# Patient Record
Sex: FEMALE
[De-identification: names, ages, dates, MRNs, and addresses within clinical notes are randomized per-mention and may not be internally consistent; named-entity substitution may affect disease eponyms.]

---

## 2022-10-06 PROBLEM — Z00.00 ENCOUNTER FOR PREVENTIVE HEALTH EXAMINATION: Status: ACTIVE | Noted: 2022-10-06

## 2022-10-13 ENCOUNTER — APPOINTMENT (OUTPATIENT)
Dept: PSYCHIATRY | Facility: CLINIC | Age: 47
End: 2022-10-13

## 2022-10-13 PROCEDURE — 90791 PSYCH DIAGNOSTIC EVALUATION: CPT | Mod: 95

## 2022-10-13 NOTE — FAMILY HISTORY
[FreeTextEntry1] : please see social hx for more indepth information however this will be further collected during individual intakes.\par Patient reports her father had significant substance abuse hx
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
## 2022-10-13 NOTE — PSYCHOSOCIAL ASSESSMENT
[None known] : None known [Other: _____] : [unfilled] [FreeTextEntry2] : see social hx for more detail [FreeTextEntry3] : both participants with good social functioning [FreeTextEntry1] : n/a [had nightmares about the event(s) or thought about the event(s) when you did not want] : did not have nightmares and/or unwanted thoughts about the events [tried hard not to think about the event(s) or went out of your way to avoid situations that reminded you of the event] : did not need to avoid thinking about events, did not need to avoid situations that might remind patient of events [has been constantly on guard, watchful, or easily startled] : has not been constantly on guard, watchful, or easily startled [felt numb or detached from people, activities, or your surrounding] : has not felt numb or detached from people, activities, or surroundings [felt guilty or unable to stop blaming yourself or others for the event(s) or any problems the event(s) may have caused] : has not felt guilty or unable to stop blaming self or others for event(s), or any problems the event(s) may have caused [No] : No [Competitive and integrated employment] : Competitive and integrated employment [35 hours or more] : 35 hours or more [Financially stable] : financially stable [FreeTextEntry4] : independent housing, no housing needs

## 2022-10-13 NOTE — HISTORY OF PRESENT ILLNESS
[FreeTextEntry2] : none noted prior to  hx for partner and none noted for patient, however this will be fully explored during individual intakes [FreeTextEntry1] : dyad report that they have been under a great deal of stress since the inception of their relationship as a result of being engaged in a work project. Patient's partner with significant combat  trauma and  experience

## 2022-10-13 NOTE — REASON FOR VISIT
[Continuing, patient not seen in-person within last 12 months (provide details below)] : Telehealth services are continuing, patient not seen in-person within last 12 months.  [Telehealth (audio & video) - Couple/Family] : This visit was provided via telehealth using real-time 2-way audio visual technology.  [Home] : The patient, [unfilled], was located at home, [unfilled], at the time of the visit. [Partner] : partner [Number can be texted] : number can be texted [OK  to leave message] : OK  to leave message [FreeTextEntry3] : [email or web address] [Patient] : Patient [Other:___] : [unfilled] [FreeTextEntry2] : couple under a great deal of stress [FreeTextEntry1] : increased tension and difficulty managing stress within the relationship

## 2022-10-13 NOTE — PLAN
[Every ___ week(s)] : Psychotherapy: Every [unfilled] week(s) [Family/Marital/Collateral Therapy] : Family/Marital/Collateral Therapy [FreeTextEntry4] : weekly couples sessions to help with effective communication, mindfulness of stress management skills, psychoeducation and supportive talk therapy

## 2022-10-13 NOTE — RISK ASSESSMENT
[Clinical Interview] : Clinical Interview [No] : No [No known suicide factors] : No known suicide factors [Legal problems] : legal problems [Other: ___] : [unfilled] [Identifies reasons for living] : identifies reasons for living [Supportive social network of family or friends] : supportive social network of family or friends [Engaged in work or school] : engaged in work or school [Beloved pets] : beloved pets [None in the patient's lifetime] : None in the patient's lifetime [No known risk factors] : No known risk factors [No known protective factors] : No known protective factors

## 2022-10-18 ENCOUNTER — APPOINTMENT (OUTPATIENT)
Dept: PSYCHIATRY | Facility: CLINIC | Age: 47
End: 2022-10-18

## 2022-10-25 ENCOUNTER — APPOINTMENT (OUTPATIENT)
Dept: PSYCHIATRY | Facility: CLINIC | Age: 47
End: 2022-10-25

## 2022-10-27 ENCOUNTER — APPOINTMENT (OUTPATIENT)
Dept: PSYCHIATRY | Facility: CLINIC | Age: 47
End: 2022-10-27

## 2022-10-27 DIAGNOSIS — F41.1 GENERALIZED ANXIETY DISORDER: ICD-10-CM

## 2022-10-27 DIAGNOSIS — Z63.0 PROBLEMS IN RELATIONSHIP WITH SPOUSE OR PARTNER: ICD-10-CM

## 2022-10-27 PROCEDURE — 90791 PSYCH DIAGNOSTIC EVALUATION: CPT | Mod: 95

## 2022-10-27 SDOH — SOCIAL STABILITY - SOCIAL INSECURITY: PROBLEMS IN RELATIONSHIP WITH SPOUSE OR PARTNER: Z63.0

## 2022-10-30 PROBLEM — Z63.0 PARTNER RELATIONSHIP PROBLEM: Status: ACTIVE | Noted: 2022-10-13

## 2022-10-30 PROBLEM — F41.1 GAD (GENERALIZED ANXIETY DISORDER): Status: ACTIVE | Noted: 2022-10-13

## 2022-10-30 NOTE — RISK ASSESSMENT
[No, patient denies ideation or behavior] : No, patient denies ideation or behavior [Clinical Interview] : Clinical Interview [No] : No [No known suicide factors] : No known suicide factors [Legal problems] : legal problems [Other: ___] : [unfilled] [Identifies reasons for living] : identifies reasons for living [Supportive social network of family or friends] : supportive social network of family or friends [Engaged in work or school] : engaged in work or school [Beloved pets] : beloved pets [None in the patient's lifetime] : None in the patient's lifetime [No known risk factors] : No known risk factors [No known protective factors] : No known protective factors
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
## 2022-10-30 NOTE — REASON FOR VISIT
[Continuing, patient not seen in-person within last 12 months (provide details below)] : Telehealth services are continuing, patient not seen in-person within last 12 months.  [Telehealth (audio & video) - Couple/Family] : This visit was provided via telehealth using real-time 2-way audio visual technology.  [Home] : The patient, [unfilled], was located at home, [unfilled], at the time of the visit. [Verbal consent obtained from patient/other participant(s)] : Verbal consent for telehealth/telephonic services obtained from patient/other participant(s) [Number can be texted] : number can be texted [OK  to leave message] : OK  to leave message [FreeTextEntry3] : [email or web address] [Patient] : Patient [Other:___] : [unfilled] [FreeTextEntry2] : couple under a great deal of stress [FreeTextEntry1] : increased tension and difficulty managing stress within the relationship